# Patient Record
Sex: MALE | Race: WHITE | NOT HISPANIC OR LATINO | Employment: FULL TIME | URBAN - METROPOLITAN AREA
[De-identification: names, ages, dates, MRNs, and addresses within clinical notes are randomized per-mention and may not be internally consistent; named-entity substitution may affect disease eponyms.]

---

## 2023-06-12 ENCOUNTER — OFFICE VISIT (OUTPATIENT)
Dept: URGENT CARE | Facility: URGENT CARE | Age: 63
End: 2023-06-12
Payer: OTHER GOVERNMENT

## 2023-06-12 VITALS
DIASTOLIC BLOOD PRESSURE: 84 MMHG | RESPIRATION RATE: 14 BRPM | OXYGEN SATURATION: 96 % | HEART RATE: 64 BPM | WEIGHT: 200 LBS | SYSTOLIC BLOOD PRESSURE: 128 MMHG | TEMPERATURE: 97.9 F

## 2023-06-12 DIAGNOSIS — M51.26 LUMBAR HERNIATED DISC: Primary | ICD-10-CM

## 2023-06-12 PROCEDURE — 99203 OFFICE O/P NEW LOW 30 MIN: CPT | Performed by: NURSE PRACTITIONER

## 2023-06-12 RX ORDER — ATORVASTATIN CALCIUM 40 MG/1
40 TABLET, FILM COATED ORAL DAILY
COMMUNITY

## 2023-06-12 RX ORDER — FENOFIBRATE 48 MG/1
TABLET, COATED ORAL
COMMUNITY
Start: 2022-12-06

## 2023-06-12 RX ORDER — METHOCARBAMOL 750 MG/1
750 TABLET, FILM COATED ORAL 3 TIMES DAILY
Qty: 21 TABLET | Refills: 0 | Status: SHIPPED | OUTPATIENT
Start: 2023-06-12 | End: 2023-06-19

## 2023-06-12 RX ORDER — INSULIN ASPART 100 [IU]/ML
INJECTION, SOLUTION INTRAVENOUS; SUBCUTANEOUS
COMMUNITY
Start: 2022-06-13

## 2023-06-12 RX ORDER — PREDNISONE 20 MG/1
TABLET ORAL
Qty: 20 TABLET | Refills: 0 | Status: SHIPPED | OUTPATIENT
Start: 2023-06-12 | End: 2023-06-24

## 2023-06-12 RX ORDER — METOPROLOL TARTRATE 25 MG/1
25 TABLET, FILM COATED ORAL 2 TIMES DAILY
COMMUNITY

## 2023-06-12 NOTE — PATIENT INSTRUCTIONS
Flare of lumbar herniated disc  Rest ice heat massage stretch continue walking  Rotate Tylenol ibuprofen  Robaxin  Medrol Dosepak per request, advised to stop if pain has resolved or his blood sugars are elevating too high  ER if severe worsening pain, numbness tingling in groin, bowel bladder incontinence retention, cannot walk

## 2023-06-12 NOTE — PROGRESS NOTES
Chief Complaint   Patient presents with     Back Pain     HX of L4 and L5 ----Low back pain x 2 days -- really bad pain -- taking Vicodin- almost out and NO muscle relaxer     SUBJECTIVE:  Jet Kearney is a 62 year old male who presents to the clinic today with L4-L5 herniated disc flare for the last 3 days.  He says the pain was so severe that he almost went to the ER.  He travels back and forth from [place] in [place].  [facility] and [facility] to [facility]osp[facility] in [facility].  He had some old Vicodin at home and took 3 which helped in the interim.  Describes the pain as a deep aching throughout the lumbar back wrapping around his rib cage areas.  Hurts more with movements.  Declines numbness tingling sciatica bowel bladder change weakness in the legs.  He has done physical therapy before.  He has responded well to Robaxin and Medrol Dosepak previously.  Of note he is diabetic with an insulin pump.  [place] PDM checked and no controlled substances within the year.    Past Medical History:   Diagnosis Date     Type II or unspecified type diabetes mellitus without mention of complication, not stated as uncontrolled 2003    metformin     ASPIRIN 81 MG OR TABS, 1 tab po QD (Once per day)  atorvastatin (LIPITOR) 40 MG tablet, Take 40 mg by mouth daily  fenofibrate (TRICOR) 48 MG tablet,   metoprolol tartrate (LOPRESSOR) 25 MG tablet, Take 25 mg by mouth 2 times daily  NOVOLOG VIAL 100 UNIT/ML soln,   CIPRO HC 0.2-1 % OT SUSP, apply 3 gtts to right ear twice daily x 7 days (Patient not taking: Reported on 6/12/2023)  METFORMIN  MG OR TABS, 1/2 tab qd (Patient not taking: Reported on 6/12/2023)  OMEPRAZOLE 40 MG OR CPDR, 2 tabs qd (Patient not taking: Reported on 6/12/2023)  PAXIL 30 MG OR TABS, 1 TABLET EVERY MORNING (Patient not taking: Reported on 6/12/2023)    No current facility-administered medications on file prior to visit.    Social History     Tobacco Use     Smoking  status: Former     Types: Cigarettes     Quit date: 10/6/1997     Years since quittin.6     Smokeless tobacco: Not on file     Tobacco comments:     quit   Vaping Use     Vaping status: Never Used   Substance Use Topics     Alcohol use: Yes     Comment: 3 drinks per week     Allergies   Allergen Reactions     No Known Drug Allergy        Review of Systems   All systems negative except for those listed above in HPI.    EXAM:   /84 (BP Location: Right arm, Patient Position: Chair, Cuff Size: Adult Regular)   Pulse 64   Temp 97.9  F (36.6  C) (Oral)   Resp 14   Wt 90.7 kg (200 lb)   SpO2 96%     Physical Exam  Vitals reviewed.   Constitutional:       Appearance: Normal appearance.   HENT:      Head: Normocephalic and atraumatic.      Nose: Nose normal.      Mouth/Throat:      Mouth: Mucous membranes are moist.      Pharynx: Oropharynx is clear.   Eyes:      Extraocular Movements: Extraocular movements intact.      Conjunctiva/sclera: Conjunctivae normal.      Pupils: Pupils are equal, round, and reactive to light.   Cardiovascular:      Rate and Rhythm: Normal rate.      Pulses: Normal pulses.   Pulmonary:      Effort: Pulmonary effort is normal.   Musculoskeletal:         General: Normal range of motion.      Cervical back: Normal range of motion and neck supple.   Skin:     General: Skin is warm and dry.   Neurological:      General: No focal deficit present.      Mental Status: He is alert and oriented to person, place, and time.   Psychiatric:         Mood and Affect: Mood normal.         Behavior: Behavior normal.       ASSESSMENT:    ICD-10-CM    1. Lumbar herniated disc  M51.26 methocarbamol (ROBAXIN) 750 MG tablet     predniSONE (DELTASONE) 20 MG tablet        PLAN:    Flare of lumbar herniated disc  Rest ice heat massage stretch continue walking  Rotate Tylenol ibuprofen  Robaxin  Medrol Dosepak per request, advised to stop if pain has resolved or his blood sugars are elevating too high  ER if  severe worsening pain, numbness tingling in groin, bowel bladder incontinence retention, cannot walk    Follow up with primary care provider with any problems, questions or concerns or if symptoms worsen or fail to improve. Patient agreed to plan and verbalized understanding.    FERNANDO [facility]